# Patient Record
Sex: FEMALE | Race: OTHER | NOT HISPANIC OR LATINO
[De-identification: names, ages, dates, MRNs, and addresses within clinical notes are randomized per-mention and may not be internally consistent; named-entity substitution may affect disease eponyms.]

---

## 2022-10-17 ENCOUNTER — DASHBOARD ENCOUNTERS (OUTPATIENT)
Age: 37
End: 2022-10-17

## 2022-10-17 ENCOUNTER — OFFICE VISIT (OUTPATIENT)
Dept: URBAN - METROPOLITAN AREA CLINIC 19 | Facility: CLINIC | Age: 37
End: 2022-10-17
Payer: COMMERCIAL

## 2022-10-17 ENCOUNTER — TELEPHONE ENCOUNTER (OUTPATIENT)
Dept: URBAN - METROPOLITAN AREA CLINIC 19 | Facility: CLINIC | Age: 37
End: 2022-10-17

## 2022-10-17 ENCOUNTER — WEB ENCOUNTER (OUTPATIENT)
Dept: URBAN - METROPOLITAN AREA CLINIC 19 | Facility: CLINIC | Age: 37
End: 2022-10-17

## 2022-10-17 VITALS
WEIGHT: 133 LBS | BODY MASS INDEX: 21.38 KG/M2 | DIASTOLIC BLOOD PRESSURE: 64 MMHG | TEMPERATURE: 98.7 F | HEIGHT: 66 IN | SYSTOLIC BLOOD PRESSURE: 112 MMHG

## 2022-10-17 DIAGNOSIS — K59.01 CONSTIPATION: ICD-10-CM

## 2022-10-17 PROBLEM — 14760008 CONSTIPATION: Status: ACTIVE | Noted: 2022-10-17

## 2022-10-17 PROCEDURE — 99203 OFFICE O/P NEW LOW 30 MIN: CPT | Performed by: INTERNAL MEDICINE

## 2022-10-17 RX ORDER — BUPROPION HYDROCHLORIDE 300 MG/1
TAKE 1 TABLET (300 MG) BY ORAL ROUTE ONCE DAILY TABLET, EXTENDED RELEASE ORAL 1
Qty: 0 | Refills: 0 | Status: ACTIVE | COMMUNITY
Start: 1900-01-01

## 2022-10-17 RX ORDER — LINACLOTIDE 145 UG/1
1 CAPSULE AT LEAST 30 MINUTES BEFORE THE FIRST MEAL OF THE DAY ON AN EMPTY STOMACH CAPSULE, GELATIN COATED ORAL ONCE A DAY
Qty: 90 | Refills: 1 | OUTPATIENT
Start: 2022-10-17 | End: 2023-04-15

## 2022-10-17 NOTE — HPI-TODAY'S VISIT:
4/19/16 (Dr. Rodriguez Lee): This is a scheduled follow-up appointment for this patient, a 30 year old Other Race female, after a previous visit on 01/07/2016, for an evaluation for abdominal pain, gas, and constipation. she was worked up with egd in 6/2015 showiing gastritis but normal duodenal bx and no h pylori. she was started on prilosec and instructed to use fiber.   She feels that her gas and epigastric discomfort has resolved now. However, she continues to c/o constipation. She took the amitiza but felt nauseated and did not feel that the medication worked. She continues to c/o bloating. She notes constipation is worse during the week while she is working, but she is able to have 2-3 BMs/day on the weekends. She had previously been hypothyroid and was on medication. She reports that her last TSH was normal so the thyroid replacement was stopped. She denies blood or mucus in BMs. Colonoscopy 1/7/2016 (by Dr. Miguel Goldstein) with significant looping in sigmoid colon. The patient admits to no other complaints.  10/17/22: Patient presents for reevaluation of her constipation issues. She has had problems now for 6-8 years of hard stools with tenesmus and incomplete evacuation. Stools are usually Gasquet 1-3 (hard) but occasionally Gasquet 4 (normal). She has related her problems usually to stress, but prebiotics/probiotics have helped. Upon further discussion, she did mention that her problems seem to have started with her first pregnancy. Consider pelvic floor dysfunction or even rectocele as a component of her symptoms.

## 2022-10-20 ENCOUNTER — WEB ENCOUNTER (OUTPATIENT)
Dept: URBAN - METROPOLITAN AREA CLINIC 107 | Facility: CLINIC | Age: 37
End: 2022-10-20

## 2022-11-17 ENCOUNTER — WEB ENCOUNTER (OUTPATIENT)
Dept: URBAN - METROPOLITAN AREA CLINIC 19 | Facility: CLINIC | Age: 37
End: 2022-11-17

## 2022-11-22 ENCOUNTER — WEB ENCOUNTER (OUTPATIENT)
Dept: URBAN - METROPOLITAN AREA CLINIC 19 | Facility: CLINIC | Age: 37
End: 2022-11-22

## 2022-11-22 RX ORDER — BUPROPION HYDROCHLORIDE 300 MG/1
TAKE 1 TABLET (300 MG) BY ORAL ROUTE ONCE DAILY TABLET, EXTENDED RELEASE ORAL 1
Qty: 0 | Refills: 0 | Status: ACTIVE | COMMUNITY
Start: 1900-01-01

## 2022-11-22 RX ORDER — LINACLOTIDE 145 UG/1
1 CAPSULE AT LEAST 30 MINUTES BEFORE THE FIRST MEAL OF THE DAY ON AN EMPTY STOMACH CAPSULE, GELATIN COATED ORAL ONCE A DAY
Qty: 90 | Refills: 1 | Status: ACTIVE | COMMUNITY
Start: 2022-10-17 | End: 2023-04-15

## 2022-11-22 RX ORDER — LINACLOTIDE 145 UG/1
1 CAPSULE AT LEAST 30 MINUTES BEFORE THE FIRST MEAL OF THE DAY ON AN EMPTY STOMACH CAPSULE, GELATIN COATED ORAL ONCE A DAY
Qty: 90 | Refills: 1
Start: 2022-10-17 | End: 2023-05-21